# Patient Record
(demographics unavailable — no encounter records)

---

## 2019-07-25 NOTE — CR
INDICATION:



Fall 



TECHNIQUE:



Lumbar spine 3 view.



COMPARISON:



None



FINDINGS:



Bones: Superior endplate irregularity of the L2 vertebrae. 



Joints: Mild multilevel degenerative disc and moderate multilevel 

degenerative facet disease. 



Soft tissues: Vascular calcifications are noted. 



IMPRESSION:



Endplate irregularity of the L2 vertebrae. This represents a fracture of 

unknown age. If there is focal pain or tenderness at this level, recommend 

CT for further evaluation.



Dictated by Dina Storey MD @ Jul 25 2019  4:53PM



Signed by Dr. Dina Storey @ Jul 25 2019  4:53PM

## 2019-07-25 NOTE — EDM.PDOC
ED HPI GENERAL MEDICAL PROBLEM





- General


Chief Complaint: Lower Extremity Injury/Pain


Stated Complaint: PT FELL OFF LADDER


Time Seen by Provider: 07/25/19 14:46





- History of Present Illness


INITIAL COMMENTS - FREE TEXT/NARRATIVE: 


HISTORY AND PHYSICAL:





History of present illness:


Patient 69-year-old white female presents status post fall from several feet 

off a ladder onto her left buttock she complains of low back and left hip pain. 

There is no head or neck pain or trauma no other complaints. She's had no 

numbness weakness no incontinence or retention bowel or bladder no other 

complaints





Review of systems: 


As per history of present illness and below otherwise all systems reviewed and 

negative.





Past medical history: 


As per history of present illness and as reviewed below otherwise 

noncontributory.





Surgical history: 


As per history of present illness and as reviewed below otherwise 

noncontributory.





Social history: 


No reported history of drug or alcohol abuse.





Family history: 


As per history of present illness and as reviewed below otherwise 

noncontributory.





Physical exam:


HEENT: Atraumatic, normocephalic, pupils reactive, negative for conjunctival 

pallor or scleral icterus, mucous membranes moist, throat clear, neck supple, 

nontender, trachea midline.


Lungs: Clear to auscultation, breath sounds equal bilaterally, chest nontender.


Heart: S1S2, regular, negative for clicks, rubs, or JVD.


Abdomen: Soft, nondistended, nontender. Negative for masses or 

hepatosplenomegaly. Negative for costovertebral tenderness.


Pelvis: Stable nontender.


Genitourinary: Deferred.


Rectal: Deferred.


Extremities: No gross deformity or shortening left hip lower extremity 

neurovascular exam unremarkable


Neuro: Awake, alert, oriented. Cranial nerves II through XII unremarkable. 

Cerebellum unremarkable. Motor and sensory unremarkable throughout. Exam 

nonfocal.


Back: There is no vertebral body or point tenderness motor and sensory are 

normal deep tendon reflexes are normal


Diagnostics:


X-ray left hip with pelvis x-ray lumbar spine





Therapeutics:


None





Impression: 


#1 observation status post fall #2 left hip injury #3 low back injury





Definitive disposition and diagnosis as appropriate pending reevaluation and 

review of above.





  ** left hip


Pain Score (Numeric/FACES): 8





- Related Data


 Allergies











Allergy/AdvReac Type Severity Reaction Status Date / Time


 


Penicillins Allergy  Airway Verified 07/25/19 14:30





   Tightness  














Past Medical History


HEENT History: Reports: None


Cardiovascular History: Reports: High Cholesterol


Respiratory History: Reports: None


Gastrointestinal History: Reports: None


Genitourinary History: Reports: None


OB/GYN History: Reports: None


Musculoskeletal History: Reports: Arthritis


Neurological History: Reports: None


Psychiatric History: Reports: None


Endocrine/Metabolic History: Reports: None


Hematologic History: Reports: None


Immunologic History: Reports: None


Oncologic (Cancer) History: Reports: None


Dermatologic History: Reports: None





- Past Surgical History


Head Surgeries/Procedures: Reports: None


HEENT Surgical History: Reports: None


Cardiovascular Surgical History: Reports: None


Respiratory Surgical History: Reports: None


GI Surgical History: Reports: None


Female  Surgical History: Reports: None


Endocrine Surgical History: Reports: None


Neurological Surgical History: Reports: None


Musculoskeletal Surgical History: Reports: None


Oncologic Surgical History: Reports: None


Dermatological Surgical History: Reports: None





Social & Family History





- Family History


Family Medical History: Noncontributory





- Tobacco Use


Smoking Status *Q: Never Smoker


Second Hand Smoke Exposure: No





- Caffeine Use


Caffeine Use: Reports: Coffee





- Recreational Drug Use


Recreational Drug Use: No





Review of Systems





- Review of Systems


Review Of Systems: ROS reveals no pertinent complaints other than HPI.





ED EXAM, GENERAL





- Physical Exam


Exam: See Below (See dictation)





Course





- Vital Signs


Last Recorded V/S: 


 Last Vital Signs











Temp  36.7 C   07/25/19 17:40


 


Pulse  66   07/25/19 17:40


 


Resp  18   07/25/19 14:28


 


BP  116/62   07/25/19 17:40


 


Pulse Ox  97   07/25/19 17:40














- Orders/Labs/Meds


Meds: 


Medications














Discontinued Medications














Generic Name Dose Route Start Last Admin





  Trade Name Audra  PRN Reason Stop Dose Admin


 


Ketorolac Tromethamine  30 mg  07/25/19 17:40  07/25/19 17:50





  Toradol  IM  07/25/19 17:41  30 mg





  ONETIME ONE   Administration





     





     





     





     














Departure





- Departure


Time of Disposition: 18:32


Disposition: Home, Self-Care 01


Condition: Good


Clinical Impression: 


 Vertebral compression fracture








- Discharge Information


Referrals: 


PCP,Unknown [Primary Care Provider] - 


Forms:  ED Department Discharge


Additional Instructions: 


The following information is given to patients seen in the emergency department 

who are being discharged to home. This information is to outline your options 

for follow-up care. We provide all patients seen in our emergency department 

with a follow-up referral.





The need for follow-up, as well as the timing and circumstances, are variable 

depending upon the specifics of your emergency department visit.





If you don't have a primary care physician on staff, we will provide you with a 

referral. We always advise you to contact your personal physician following an 

emergency department visit to inform them of the circumstance of the visit and 

for follow-up with them and/or the need for any referrals to a consulting 

specialist.





The emergency department will also refer you to a specialist when appropriate. 

This referral assures that you have the opportunity for followup care with a 

specialist. All of these measure are taken in an effort to provide you with 

optimal care, which includes your followup.





Under all circumstances we always encourage you to contact your private 

physician who remains a resource for coordinating  your care. When calling for 

followup care, please make the office aware that this follow-up is from your 

recent emergency room visit. If for any reason you are refused follow-up, 

please contact the [facility] emergency department at [phone] 

and asked to speak to the emergency department charge nurse.

















Norco as prescribed follow-up primary medical doctor for reevaluation and plan 

for pain management and orthopedic consultation as indicated

## 2019-07-25 NOTE — CR
Indication:



Fall 



Technique:



Frontal view pelvis, frogleg lateral view left hip



Comparison:



None



Findings:



Bones: Alignment is normal. No fractures or bone lesions.  



Joint spaces: Mild degenerative changes in both hip joints. 



Soft tissues: There are surgical clips in the pelvis. 



Impression:



No acute fracture or subluxation.



Dictated by Dina Storey MD @ Jul 25 2019  4:50PM



Signed by Dr. Dina Storey @ Jul 25 2019  4:51PM

## 2019-07-25 NOTE — CT
INDICATION: Fall off ladder, back and hip pain



TECHNIQUE: CT lumbar spine without i.v. contrast. Coronal and sagittal 

reformats were obtained.



COMPARISON: None



FINDINGS: 



Alignment: Unremarkable. 



Bone: Mild compression fracture is superior endplate L2 with loss of less 

than 25 percent of vertebral body height. There is a transitional vertebral 

body present which will be labeled as S1 on this exam. No osseous 

retropulsion or central canal stenosis is seen at any level. 



Disc: Broad-based disc protrusion is present at L4-5 and L5-S1 contributing 

to central canal stenosis and bilateral neural foraminal narrowing. Severe 

bilateral facet osteoarthritis is present at L4-5 and L5-S1. Moderate 

bilateral facet osteoarthritis is seen at L3-4 



Soft tissue: The perivertebral soft tissues and visualized retroperitoneum 

are unremarkable in appearance. 



IMPRESSION: 



1. Mild compression fracture is superior endplate L2 with loss of less than 

25 percent of vertebral body height.



Dictated by Peter Linda MD @ 7/25/2019 6:24:54 PM



Please note that all CT scans at this facility use dose modulation, 

iterative reconstruction, and/or weight-based dosing when appropriate to 

reduce radiation dose to as low as reasonably achievable.



Dictated by: Peter Linda MD @ 07/25/2019 18:24:59



(Electronically Signed)